# Patient Record
Sex: MALE | Race: WHITE | Employment: FULL TIME | URBAN - METROPOLITAN AREA
[De-identification: names, ages, dates, MRNs, and addresses within clinical notes are randomized per-mention and may not be internally consistent; named-entity substitution may affect disease eponyms.]

---

## 2019-12-09 ENCOUNTER — TRANSFERRED RECORDS (OUTPATIENT)
Dept: HEALTH INFORMATION MANAGEMENT | Facility: CLINIC | Age: 34
End: 2019-12-09

## 2020-01-07 ENCOUNTER — TRANSFERRED RECORDS (OUTPATIENT)
Dept: HEALTH INFORMATION MANAGEMENT | Facility: CLINIC | Age: 35
End: 2020-01-07

## 2020-01-09 PROBLEM — K21.9 GERD (GASTROESOPHAGEAL REFLUX DISEASE): Status: ACTIVE | Noted: 2019-10-04

## 2020-01-09 PROBLEM — K03.2 DENTAL EROSION: Status: ACTIVE | Noted: 2019-10-04

## 2020-01-09 PROBLEM — C92.00 AML (ACUTE MYELOBLASTIC LEUKEMIA) (H): Status: ACTIVE | Noted: 2019-12-30

## 2020-01-09 PROBLEM — H04.123 DRY EYE SYNDROME OF BOTH EYES: Status: ACTIVE | Noted: 2019-11-08

## 2020-03-11 ENCOUNTER — HEALTH MAINTENANCE LETTER (OUTPATIENT)
Age: 35
End: 2020-03-11

## 2020-03-13 DIAGNOSIS — D89.813 GVHD (GRAFT VERSUS HOST DISEASE) (H): ICD-10-CM

## 2020-03-13 DIAGNOSIS — C92.01 AML (ACUTE MYELOID LEUKEMIA) IN REMISSION (H): Primary | ICD-10-CM

## 2020-03-13 DIAGNOSIS — Z76.82 LUNG TRANSPLANT CANDIDATE: ICD-10-CM

## 2020-03-13 NOTE — Clinical Note
Appointment Request: Pre Lung Transplant New or Return Visit: NPT with testing Reason for Visit/Diagnosis: GVHD  Orders Placed: Yes  Appointment Timeframe Requested: 5/14/2020 at 310 with Pereira. Please schedule PFT complete, 6MW, Labs in AM 5/14. Patient to be seen by BMT team 12-3PM on 5/14.  Patient Aware? Yes. Physician Override Approved? N/A   Thank you,  Gela Torres RN

## 2020-03-13 NOTE — PROGRESS NOTES
Patient scheduled for a New Patient Visit with Dr Pereira for consideration of lung transplant evaluation. Patient to complete PFT complete, Six minute walk, labs prior to transplant pulmonary appointment. Dr Pereira requesting patient be seen by [facility] BMT team to provide input regarding Hx of AML and GVHD in relation to lung transplant. BMT has scheduled patient with their team on same day.

## 2020-05-14 ENCOUNTER — VIRTUAL VISIT (OUTPATIENT)
Dept: TRANSPLANT | Facility: CLINIC | Age: 35
End: 2020-05-14
Attending: INTERNAL MEDICINE
Payer: COMMERCIAL

## 2020-05-14 DIAGNOSIS — C92.01 ACUTE MYELOID LEUKEMIA IN REMISSION (H): Primary | ICD-10-CM

## 2020-05-14 DIAGNOSIS — D89.811 CHRONIC GVHD COMPLICATING BONE MARROW TRANSPLANTATION, EXTENSIVE (H): ICD-10-CM

## 2020-05-14 DIAGNOSIS — Z76.82 LUNG TRANSPLANT CANDIDATE: Primary | ICD-10-CM

## 2020-05-14 DIAGNOSIS — Z71.9 VISIT FOR COUNSELING: Primary | ICD-10-CM

## 2020-05-14 DIAGNOSIS — T86.09 CHRONIC GVHD COMPLICATING BONE MARROW TRANSPLANTATION, EXTENSIVE (H): ICD-10-CM

## 2020-05-14 DIAGNOSIS — D89.813 GVHD (GRAFT VERSUS HOST DISEASE) (H): ICD-10-CM

## 2020-05-14 RX ORDER — SULFAMETHOXAZOLE/TRIMETHOPRIM 800-160 MG
1 TABLET ORAL DAILY
COMMUNITY
Start: 2019-11-13

## 2020-05-14 RX ORDER — ALBUTEROL SULFATE 90 UG/1
2 AEROSOL, METERED RESPIRATORY (INHALATION) EVERY 6 HOURS PRN
COMMUNITY
Start: 2019-10-31

## 2020-05-14 RX ORDER — MONTELUKAST SODIUM 10 MG/1
10 TABLET ORAL EVERY EVENING
Qty: 30 TABLET | Refills: 11 | COMMUNITY
Start: 2020-05-14

## 2020-05-14 RX ORDER — BUDESONIDE AND FORMOTEROL FUMARATE DIHYDRATE 160; 4.5 UG/1; UG/1
2 AEROSOL RESPIRATORY (INHALATION)
COMMUNITY
Start: 2019-10-31

## 2020-05-14 RX ORDER — ACYCLOVIR 800 MG/1
800 TABLET ORAL EVERY 12 HOURS
COMMUNITY
Start: 2019-10-31

## 2020-05-14 RX ORDER — PREDNISONE 20 MG/1
40 TABLET ORAL EVERY OTHER DAY
COMMUNITY
Start: 2019-10-31

## 2020-05-14 RX ORDER — POSACONAZOLE 100 MG/1
300 TABLET, DELAYED RELEASE ORAL DAILY
COMMUNITY
Start: 2019-10-25

## 2020-05-14 RX ORDER — LEVOFLOXACIN 750 MG/1
750 TABLET, FILM COATED ORAL
COMMUNITY
Start: 2019-12-09 | End: 2020-05-14

## 2020-05-14 RX ORDER — ALBUTEROL SULFATE 0.83 MG/ML
2.5 SOLUTION RESPIRATORY (INHALATION) EVERY 4 HOURS PRN
COMMUNITY
Start: 2019-10-31

## 2020-05-14 RX ORDER — ERGOCALCIFEROL 1.25 MG/1
50000 CAPSULE, LIQUID FILLED ORAL WEEKLY
COMMUNITY
Start: 2019-11-23

## 2020-05-14 ASSESSMENT — PAIN SCALES - GENERAL: PAINLEVEL: NO PAIN (0)

## 2020-05-14 NOTE — PROGRESS NOTES
Reason for Visit  Artie Chan is a 35 year old year old male who is being seen for Video Visit (Consult to discuss lung transplant )  Pulmonary HPI  The patient was seen and examined by Ozzylevy Pereira MD.    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 He is followed by Dr. Sosa (pulmonary) at [facility].    He initially present with URI sx, Nausea/diarrhoea and blood work showed 280K WBC with 20% blasts which lead to diagnosis of AML (Acute monocytic leukemia - HAYDE M5). He also had CNS involvement which was treated with IT chemo in addition to induction/Consolidation, completed in 11/2014. CNS relapse in 7/2015 which was treated leading to complete remission again. This was followed by BMT on 11/24/15. He had not been seen in BMT clinic since 2017 to 1/2020.    Post tx course complicated by cGVHD of eyes, skin, mouth and lungs. He has been on tacrolimus in the past.    He presented to his PCP on 10/2019 Cough/SOB and unintentional weight loss of 12kg in six months (last six months of 2019).       At baseline has mild persistent cough and SOB.  Due to SOB has had to stop working in Jan 2020. He was treated with levaquin in 12/2020 for poss pna. On 11/22 due to worsening sx, RVP done which was positive for Rhino. His FEV1 declined from 91% (7/2016) to 31% (10/2019). He is able to walk may be about  2- 3 blocks before having to stop.  He has gained ~30pounds since Jan 2020 and is about 190#. No leg swelling.  No F/C/S.       Exposures: Former smoker quit (~3 months of use). Works in [employer] lately but was wearing his REspirator.  Has a small dog. NO Asbestos/Mold at home or TB exposure.    Oxygen use: None yet.      Current Outpatient Medications   Medication     acyclovir (ZOVIRAX) 800 MG tablet     albuterol (PROAIR  HFA/PROVENTIL HFA/VENTOLIN HFA) 108 (90 Base) MCG/ACT inhaler     albuterol (PROVENTIL) (2.5 MG/3ML) 0.083% neb solution     budesonide-formoterol (SYMBICORT) 160-4.5 MCG/ACT Inhaler     montelukast (SINGULAIR) 10 MG tablet     posaconazole (NOXAFIL) 100 MG EC tablet     predniSONE (DELTASONE) 20 MG tablet     ruxolitinib 10 MG TABS tablet     sulfamethoxazole-trimethoprim (BACTRIM DS) 800-160 MG tablet     vitamin D2 (ERGOCALCIFEROL) 09326 units (1250 mcg) capsule     No current facility-administered medications for this visit.      No Known Allergies  Past Medical History:   Diagnosis Date     AML M5 (acute monocytic leukemia) (H) 2014    Initial presentation at .     AML M5 (acute monocytic leukemia) in relapse (H) 07/2015    Relapse [facility] - CNS relapse     FH: bone marrow transplant 11/24/2015     GERD (gastroesophageal reflux disease)      GVHD as complication of bone marrow transplant (H)     Involving eyes, skin, mouth and lungs     History of blood transfusion 2014            Past Surgical History:   Procedure Laterality Date     BIOPSY      bone marrow-     BRONCHOSCOPY       TRANSPLANT  2015    [facility]       Social History     Socioeconomic History     Marital status:      Spouse name: Not on file     Number of children: Not on file     Years of education: Not on file     Highest education level: Not on file   Occupational History     Not on file   Social Needs     Financial resource strain: Not on file     Food insecurity     Worry: Not on file     Inability: Not on file     Transportation needs     Medical: Not on file     Non-medical: Not on file   Tobacco Use     Smoking status: Former Smoker     Types: Cigarettes     Smokeless tobacco: Current User     Types: Chew     Tobacco comment: smoked for 1 yr age 19, chews 1 can/week   Substance and Sexual Activity     Alcohol use: Not Currently     Drug use: Never     Sexual activity: Not on file   Lifestyle     Physical activity      Days per week: Not on file     Minutes per session: Not on file     Stress: Not on file   Relationships     Social connections     Talks on phone: Not on file     Gets together: Not on file     Attends L[employer] service: Not on file     Active member of club or organization: Not on file     Attends meetings of clubs or organizations: Not on file     Relationship status: Not on file     Intimate partner violence     Fear of current or ex partner: Not on file     Emotionally abused: Not on file     Physically abused: Not on file     Forced sexual activity: Not on file   Other Topics Concern     Parent/sibling w/ CABG, MI or angioplasty before 65F 55M? Not Asked   Social History Narrative     Not on file       Family History   Problem Relation Age of Onset     Heart Failure Maternal Grandfather      Diabetes Paternal Aunt      Hypertension Maternal Uncle        ROS Pulmonary  Constitutional- Negative  Eyes- Negative  Ear, nose and throat- Negative  Cardiac- Negative  Pulm- See HPI  GI- Negative  Genitourinary- Negative  Musculoskeletal- Positive for Arthalgias (Hips and knees, new pain that started this week) and myalgias (intermittent cramping of hands and feet).   Neurology- Negative  Dermatology- Negative  Endocrine- Negative  Lymphatic- Negative  Psychiatry- Negative  A complete ROS was otherwise negative except as noted in the HPI.  Constitutional - looks well, in no apparent distress  Eyes - no redness or discharge  Respiratory -breathing appears comfortable.  No cough. Speaking in full sentences.  Skin - No appreciable discoloration or lesions (very limited exam)  Neurological - No apparent tremors. Speech fluent and articlate  Psychiatric - no signs of delirium or anxiety     Exam limited to that easily identified on a virtual visit. The rest of a comprehensive physical examination is deferred due to PHE (public health emergency) video visit restrictions.  Results:  No results found for this or any previous  visit (from the past 168 hour(s)).    Spirometry from 4/21/20:  FVC 3.97L  63%  FEV1 1.6L 31%  Ratio 40  Conclusion: Very severe lung disease.     Chest CT (4/20/20):  Stable mild bronchiectasis, bronchial wall thickening, scattered areas of scarring, groundglass opacity and air trapping. This constellation of findings may be from bronchiolitis bladder and as a manifestation of sqqnx-xjxopx-sdjx disease. Sequela of chronic atypical infection such as from mycobacterium avium complex (MAC) could also produce this appearance. Stable dilated aortic root up to 4.2 cm.    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 No new pulmonary opacities identified. Stable dilatation of the aortic root, measuring approximately 4.3 cm.    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 He was started prednisone 60mg every other day (11/2019). It was decreased to 20mg every other day but due to SOB it was increased to 40mg every other day, Symbicort and singulair. He was started on Jakafi on 11/20/2019 for this.     It might be reasonable to consider daily azithromycin given it's benefit in post lung transplant OB. Agree with taper to 20mg every other day.     2. H/o AML, S/p BMT: We will need BMT team input as to the risk of relapse.  BMT complications include: Lung involvement as mentioned above.  cGVHD of Eyes - seen by ophthalmology, on artificial tears.  Xerostosis and Rash are not an issue at this time.    3. GERD: Sx are well controlled and is not on PPI at this time.    4. ID:   Bactrim prophylaxis based on significant prednisone dose.  Post BMT propylaxis:   -  On Acyclovir,  Posaconazole prophy started on 10/25/19 when started on steroid burst/taper.    5. Lung transplant consideration:    A. I spent quite some time discussing both the lung transplantation evaluation listing process including complications that can be expected post lung transplantation.     B. One of the main points I did reinforce is that the survival post lung transplantation at this time is around 50 to 55% at 5 years. The main reason for this is infection and/or rejection. While most bacterial infections are treatable, the viral infections can cause significant morbidity and mortality. Acute rejection is usually treatable with high dose steroids but chronic rejection (WESLEY) as you already know does not have good therapy as of date. The other main point is that there is minimal to no survival advantage with lung transplantation.    C. The lung transplant evaluation will involve multiple tests and meeting with cardiothoracic surgeon, Transplant , Nutritionist etc., from our transplant team. Post testing, we will discuss the details at our transplant meeting and will be listed if he meets the criteria for lung transplantation.     D. Once on the list, Artie Chan can expect to stay on the list anywhere from few months to few years, depending on the LAS score. Also the other factors that might play a role is number of organs required and whether he is positive for panel reactive antibodies. He has not recieved transfusions to date. He will need to stay within a 50 mile radius of our center for the first three months after the lung transplantation (after hospital discharge).    E. Post lung transplantation, he will require multiple bronchoscopies to evaluate for infections and/or rejections. The major complications post transplantation experienced by most of our patients include: 1. Diabetes, about 30 to 40% of our patients remain on insulin for the rest of their life. 2. Chronic  kidney disease, with majority losing about 50% of the kidney function and upto 5 ot 10% requiring hemodialysis and/or renal transplantation. 3. Hypertension, usually well controlled with medications. 4. Malignancy: Especially of skin cancer, and/or lymphoma - usually treatable. The above is not an exhaustive list of all the complications. The others include also airway complications occurring in about 5% of the patients requiring bronchoscopy with bronchial dilation, sometimes stent placement. There is increased risk for DVT and PE particularly in the first six months after transplantation.     F. Discussed with Artie Joaquin Dumont that lung transplantation is an option. The other option is to continue as is and continue cares with us or with her local pulmonologist. We will glad to have palliative care team involved in your care to help manage your symptoms.    G. Discussed about increased risk donors (For HIV/Hep C predominantly).       Issues:  - Dental extraction  - Currently on high dose steroids (~6 - 7 months), will need to wean it down to 20mg every other day    Mr Dumont was seen via video visit today. We discussed at length pros and cons about lung transplantation. He was given adequate time to ask and answer all the questions to their satisfaction. At this time he has not undergone a lung transplant evaluation. I believe is still too well to benefit from lung transplant evaluation. We will follow up with him in 6months. He is scheduled to meet with [facility] Lung tx team. We also discussed the possibility to double listing if and when he is a candidate for lung transplantation.  Thank you for allowing us to participate in the care of this wonderful patient. Please feel free to contact me if you have any questions at [phone].

## 2020-05-14 NOTE — LETTER
5/14/2020         RE: Artie Chan  [address] [place] [address]        Dear Colleague,    Thank you for referring your patient, Artie Chan, to the [facility] SOLID ORGAN TRANSPLANT. Please see a copy of my visit note below.    Reason for Visit  Artie Chan is a 35 year old year old male who is being seen for Video Visit (Consult to discuss lung transplant )  Pulmonary HPI  The patient was seen and examined by Ozzy Pereira MD.    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 He is followed by Dr. Sosa (pulmonary) at [facility].    He initially present with URI sx, Nausea/diarrhoea and blood work showed 280K WBC with 20% blasts which lead to diagnosis of AML (Acute monocytic leukemia - HAYDE M5). He also had CNS involvement which was treated with IT chemo in addition to induction/Consolidation, completed in 11/2014. CNS relapse in 7/2015 which was treated leading to complete remission again. This was followed by BMT on 11/24/15. He had not been seen in BMT clinic since 2017 to 1/2020.    Post tx course complicated by cGVHD of eyes, skin, mouth and lungs. He has been on tacrolimus in the past.    He presented to his PCP on 10/2019 Cough/SOB and unintentional weight loss of 12kg in six months (last six months of 2019).       At baseline has mild persistent cough and SOB.  Due to SOB has had to stop working in Jan 2020. He was treated with levaquin in 12/2020 for poss pna. On 11/22 due to worsening sx, RVP done which was positive for Rhino. His FEV1 declined from 91% (7/2016) to 31% (10/2019). He is able to walk may be about  2- 3 blocks before having to stop.  He has gained ~30pounds since Jan 2020 and is about 190#. No leg swelling.  No F/C/S.       Exposures: Former smoker quit (~3 months of use). 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  Has a small dog. NO Asbestos/Mold at home or TB exposure.    Oxygen use: None yet.      Current Outpatient Medications   Medication     acyclovir (ZOVIRAX) 800 MG tablet     albuterol (PROAIR HFA/PROVENTIL HFA/VENTOLIN HFA) 108 (90 Base) MCG/ACT inhaler     albuterol (PROVENTIL) (2.5 MG/3ML) 0.083% neb solution     budesonide-formoterol (SYMBICORT) 160-4.5 MCG/ACT Inhaler     montelukast (SINGULAIR) 10 MG tablet     posaconazole (NOXAFIL) 100 MG EC tablet     predniSONE (DELTASONE) 20 MG tablet     ruxolitinib 10 MG TABS tablet     sulfamethoxazole-trimethoprim (BACTRIM DS) 800-160 MG tablet     vitamin D2 (ERGOCALCIFEROL) 49714 units (1250 mcg) capsule     No current facility-administered medications for this visit.      No Known Allergies  Past Medical History:   Diagnosis Date     AML M5 (acute monocytic leukemia) (H) 2014    Initial presentation at .     AML M5 (acute monocytic leukemia) in relapse (H) 07/2015    Relapse [facility] - CNS relapse     FH: bone marrow transplant 11/24/2015     GERD (gastroesophageal reflux disease)      GVHD as complication of bone marrow transplant (H)     Involving eyes, skin, mouth and lungs     History of blood transfusion 2014            Past Surgical History:   Procedure Laterality Date     BIOPSY      bone marrow-     BRONCHOSCOPY       TRANSPLANT  2015    BMT-[facility]       Social History     Socioeconomic History     Marital status:      Spouse name: Not on file     Number of children: Not on file     Years of education: Not on file     Highest education level: Not on file   Occupational History     Not on file   Social Needs     Financial resource strain: Not on file     Food insecurity     Worry: Not on file     Inability: Not on file     Transportation needs     Medical: Not on file     Non-medical: Not on file   Tobacco Use     Smoking status: Former Smoker     Types: Cigarettes     Smokeless  tobacco: Current User     Types: Chew     Tobacco comment: smoked for 1 yr age 19, chews 1 can/week   Substance and Sexual Activity     Alcohol use: Not Currently     Drug use: Never     Sexual activity: Not on file   Lifestyle     Physical activity     Days per week: Not on file     Minutes per session: Not on file     Stress: Not on file   Relationships     Social connections     Talks on phone: Not on file     Gets together: Not on file     Attends [employer] service: Not on file     Active member of club or organization: Not on file     Attends meetings of clubs or organizations: Not on file     Relationship status: Not on file     Intimate partner violence     Fear of current or ex partner: Not on file     Emotionally abused: Not on file     Physically abused: Not on file     Forced sexual activity: Not on file   Other Topics Concern     Parent/sibling w/ CABG, MI or angioplasty before 65F 55M? Not Asked   Social History Narrative     Not on file       Family History   Problem Relation Age of Onset     Heart Failure Maternal Grandfather      Diabetes Paternal Aunt      Hypertension Maternal Uncle        ROS Pulmonary  Constitutional- Negative  Eyes- Negative  Ear, nose and throat- Negative  Cardiac- Negative  Pulm- See HPI  GI- Negative  Genitourinary- Negative  Musculoskeletal- Positive for Arthalgias (Hips and knees, new pain that started this week) and myalgias (intermittent cramping of hands and feet).   Neurology- Negative  Dermatology- Negative  Endocrine- Negative  Lymphatic- Negative  Psychiatry- Negative  A complete ROS was otherwise negative except as noted in the HPI.  Constitutional - looks well, in no apparent distress  Eyes - no redness or discharge  Respiratory -breathing appears comfortable.  No cough. Speaking in full sentences.  Skin - No appreciable discoloration or lesions (very limited exam)  Neurological - No apparent tremors. Speech fluent and articlate  Psychiatric - no signs of delirium  or anxiety     Exam limited to that easily identified on a virtual visit. The rest of a comprehensive physical examination is deferred due to PHE (public health emergency) video visit restrictions.  Results:  No results found for this or any previous visit (from the past 168 hour(s)).    Spirometry from 4/21/20:  FVC 3.97L  63%  FEV1 1.6L 31%  Ratio 40  Conclusion: Very severe lung disease.     Chest CT (4/20/20):  Stable mild bronchiectasis, bronchial wall thickening, scattered areas of scarring, groundglass opacity and air trapping. This constellation of findings may be from bronchiolitis bladder and as a manifestation of kuast-jujjyi-rdmz disease. Sequela of chronic atypical infection such as from mycobacterium avium complex (MAC) could also produce this appearance. Stable dilated aortic root up to 4.2 cm.    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 No new pulmonary opacities identified. Stable dilatation of the aortic root, measuring approximately 4.3 cm.    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 He was started prednisone 60mg every other day (11/2019). It was decreased to 20mg every other day but due to SOB it was increased to 40mg every other day, Symbicort and singulair. He was started on Jakafi on 11/20/2019 for this.     It might be reasonable to consider daily azithromycin given it's benefit in post lung transplant OB. Agree with taper to 20mg every other day.     2. H/o AML, S/p BMT: We will need BMT team input as to the risk of relapse.  BMT complications include: Lung involvement as mentioned above.  cGVHD of Eyes - seen by  ophthalmology, on artificial tears.  Xerostosis and Rash are not an issue at this time.    3. GERD: Sx are well controlled and is not on PPI at this time.    4. ID:   Bactrim prophylaxis based on significant prednisone dose.  Post BMT propylaxis:   - On Acyclovir,  Posaconazole prophy started on 10/25/19 when started on steroid burst/taper.    5. Lung transplant consideration:    A. I spent quite some time discussing both the lung transplantation evaluation listing process including complications that can be expected post lung transplantation.     B. One of the main points I did reinforce is that the survival post lung transplantation at this time is around 50 to 55% at 5 years. The main reason for this is infection and/or rejection. While most bacterial infections are treatable, the viral infections can cause significant morbidity and mortality. Acute rejection is usually treatable with high dose steroids but chronic rejection (WESLEY) as you already know does not have good therapy as of date. The other main point is that there is minimal to no survival advantage with lung transplantation.    C. The lung transplant evaluation will involve multiple tests and meeting with cardiothoracic surgeon, Transplant , Nutritionist etc., from our transplant team. Post testing, we will discuss the details at our transplant meeting and will be listed if he meets the criteria for lung transplantation.     D. Once on the list, Artie Chan can expect to stay on the list anywhere from few months to few years, depending on the LAS score. Also the other factors that might play a role is number of organs required and whether he is positive for panel reactive antibodies. He has not recieved transfusions to date. He will need to stay within a 50 mile radius of our center for the first three months after the lung transplantation (after hospital discharge).    E. Post lung transplantation, he will require multiple  bronchoscopies to evaluate for infections and/or rejections. The major complications post transplantation experienced by most of our patients include: 1. Diabetes, about 30 to 40% of our patients remain on insulin for the rest of their life. 2. Chronic kidney disease, with majority losing about 50% of the kidney function and upto 5 ot 10% requiring hemodialysis and/or renal transplantation. 3. Hypertension, usually well controlled with medications. 4. Malignancy: Especially of skin cancer, and/or lymphoma - usually treatable. The above is not an exhaustive list of all the complications. The others include also airway complications occurring in about 5% of the patients requiring bronchoscopy with bronchial dilation, sometimes stent placement. There is increased risk for DVT and PE particularly in the first six months after transplantation.     F. Discussed with Artie Joaquin Dustin that lung transplantation is an option. The other option is to continue as is and continue cares with us or with her local pulmonologist. We will glad to have palliative care team involved in your care to help manage your symptoms.    G. Discussed about increased risk donors (For HIV/Hep C predominantly).       Issues:  - Dental extraction  - Currently on high dose steroids (~6 - 7 months), will need to wean it down to 20mg every other day    Mr Dumont was seen via video visit today. We discussed at length pros and cons about lung transplantation. He was given adequate time to ask and answer all the questions to their satisfaction. At this time he has not undergone a lung transplant evaluation. I believe is still too well to benefit from lung transplant evaluation. We will follow up with him in 6months. He is scheduled to meet with [facility] Lung tx team. We also discussed the possibility to double listing if and when he is a candidate for lung transplantation.  Thank you for allowing us to participate in the care of this wonderful  "patient. Please feel free to contact me if you have any questions at [phone].      Artie Chan is a 35 year old male who is being evaluated via a billable video visit.      The patient has been notified of following:     \"This video visit will be conducted via a call between you and your physician/provider. We have found that certain health care needs can be provided without the need for an in-person physical exam.  This service lets us provide the care you need with a video conversation.  If a prescription is necessary we can send it directly to your pharmacy.  If lab work is needed we can place an order for that and you can then stop by our lab to have the test done at a later time.    Video visits are billed at different rates depending on your insurance coverage.  Please reach out to your insurance provider with any questions.    If during the course of the call the physician/provider feels a video visit is not appropriate, you will not be charged for this service.\"    Patient has given verbal consent for Video visit? Yes    How would you like to obtain your AVS? [facility]    Patient would like the video invitation sent by: Text to cell phone: [phone]    Will anyone else be joining your video visit? No        Video-Visit Details    Type of service:  Video Visit    Video Start Time: 3:05 PM  Video End Time: 4:12 PM    Originating Location (pt. Location): Home    Distant Location (provider location):  [facility] SOLID ORGAN TRANSPLANT     Platform used for Video Visit: Modesta Pereira MD      "

## 2020-05-14 NOTE — PROGRESS NOTES
"Artie Chan is a 35 year old male who is being evaluated via a billable video visit.      The patient has been notified of following:     \"This video visit will be conducted via a call between you and your physician/provider. We have found that certain health care needs can be provided without the need for an in-person physical exam.  This service lets us provide the care you need with a video conversation.  If a prescription is necessary we can send it directly to your pharmacy.  If lab work is needed we can place an order for that and you can then stop by our lab to have the test done at a later time.    Video visits are billed at different rates depending on your insurance coverage.  Please reach out to your insurance provider with any questions.    If during the course of the call the physician/provider feels a video visit is not appropriate, you will not be charged for this service.\"    Patient has given verbal consent for Video visit? Yes    How would you like to obtain your AVS? [facility]    Patient would like the video invitation sent by: Text to cell phone: [phone]    Will anyone else be joining your video visit? No        Video-Visit Details    Type of service:  Video Visit    Video Start Time:12 noon  Video End Time:12:40P    Originating Location (pt. Location): Home    Distant Location (provider location):  [facility] BLOOD AND MARROW TRANSPLANT     Platform used for Video Visit: [employer]     This is a video visit (total time 35 minutes) with this 35-year-old man who is a candidate for a lung transplant here at the [facility].  His past history was detailed in records from [facility] [place] where I had notes from December 2019 and 2 notes from January 2020.    Briefly he presented with white count 280,000; 20% blasts in June 2014.  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  Induction with 7+3 followed by 4 HIDAC consolidation courses was completed November " 2014 but unfortunately he had a CNS relapse in July 2015.    He achieved second complete remission and underwent a myeloablative matched unrelated donor peripheral blood transplant in November 2015.  The notes do not refer to any acute GVHD and he has no recollection of symptoms sounding like acute GVH.      He developed chronic GVHD of the eyes skin mouth and lungs but had not been regularly followed at [facility] until late 2019.  He describes to me progressive limitation of exercise with coughing and some wheezing no formal respiratory infections except one episode of bronchitis.  His coughing and dyspnea  worsened from 2018 until evaluation at [facility] in late 2019.  He had had progressive less exercise tolerance, more coughing and some wheezing at nighttime.  Additionally he has bad teeth and some sore teeth with gingival involvement but he does not describe currently mouth sores, mucosal irritation, trouble with acidic foods or other things that sound like ongoing oral chronic GVH.  He does not have a dry mouth.      He has had dry eyes for some period of time uses eyedrops 3 times daily (refresh) and has some sticky white goop in his eyes that he has to pull out once daily or so.  He is not had active ocular infections and that has not been worsening.    During his evaluation in late 2019 it was noted that he had lost some weight without trying (12 kg) but he describes no food intolerance except milk gives him diarrhea (though ice cream and cottage cheese and yogurt are fine) and he has no ongoing GI upset.  He also denies skin bone joint neurologic cardiac or other ENT symptoms except as mentioned previously.    His pulmonary evaluation includes in late January FVC 75% predicted; FEV1 33% predicted; and a ratio of 35%; mid expiratory flow rate (25 -75%) of 12% predicted; and a DLCO of 96% predicted.    Other laboratory evaluation showed good blood counts, normal serum chemistries, CD3 count  in November 2019 at 347, CD4 count 256 and an IgG level of 671which is only slightly below normal. Earlier IgG values were normal.    He had an elevated serum ferritin and a low serum vitamin D.    CT scanning showed patchy scarring bronchiolar thickening and some small lung nodules that had not changed; all compatible with bronchiolitis obliterans and it was noted that his aortic root is slightly dilated 4.3 cm.      His past medical history includes a renal calcium oxalate stone in April 2015 and not many other medical problems except GERD is listed, though not active now.    His family history includes a maternal grandfather who had heart failure; aunt with diabetes and uncle with hypertension.   He is a former smoker who quit May 2014 around the time of his leukemia diagnosis though the notes from January 2020 says he still using smokeless tobacco, occasional alcohol, but no illicit drug use.  He denies allergies.    Functionally, he now says he is okay walking on level ground without a load, for  up to a quarter of a mile but if he is carrying something or has stairs to climb his exercise tolerance is much less.  It makes him breathless.  He has coughing, occasionally of phlegm mostly not green, and if he has coughing fits they only last a few seconds, not minutes.  He has wheezing at nighttime which doesn't wake him;  but he denies pleuritic pain or any chest pain at all.    We spent the rest of the time talking about my assessment that his leukemia has a very tiny chance of recurrence being in remission now 4-1/2 years post transplant; but that is not an active concern.   His chronic GVHD seems active manifest by the bronchiolitis obliterans and obstructive lung problems but his dry eyes are modest in severity and though I could not examine his mouth, he does not describe active oral mucositis.  He demonstrated in our video call good flexibility in his arms wrists and hands and he denies any other bone, joint  or skin problems.    Thus he does not have active extrapulmonary chronic moddg-bwfpqx-nrvb disease that is of concern.  He had some improvement when therapy with a variety of bronchodilators Singulair Symbicort albuterol plus prednisone and ruxolitinib were initiated in November to December and he was able to taper his prednisone from 60 mg every other day down to 40 mg every other day, but  that dose continues as there was concern about worsening breathlessness as he tapered further.    Therefore since his chronic GVHD seems limited to his lungs I see no contraindication to consider him a candidate for a lung transplant, if it is appropriate.    I noted for him that other agents, ibrutinib or a rock 2 inhibitor  have shown some promise in managing chronic GVHD, though a bit less in pulmonary disease.  They could be considered as adjunctive therapy since he is not getting notable benefit from ruxolitinib and prednisone.    I also briefly outlined for him a retrospective review that is nearly complete evaluating lung transplantation after bone marrow transplantation here at the [facility].  It is a retrospective review over a long period of time, but includes 10 lung transplants after a prior BMT.  These led to  90% 1 year, 68% 5-year and 34% 10-year survival for those lung recipients done at variable years following their bone marrow transplant.  I emphasized that this was not predictive for him but was a retrospective long-term review that is currently being summarized in preparation for publication by our BMT and solid organ transplant teams.    His questions were answered in full and I told him that I would be happy to see him if it would be helpful when he might come to the [facility] for a formal lung transplant evaluation.  He knows he can call if questions arise.    Albino Lucas MD    Professor of medicine    [phone]

## 2020-05-14 NOTE — PROGRESS NOTES
Clinical   Blood and Marrow Transplant Service  New Transplant Visit    Per BMT MD - CHARMAINE does not need to meet with patient today as visit is not for BMT - is for clearance for lung transplant.     Kendra REILLY [facility]    Clinical   5/14/2020  [facility]  Adult Blood and Marrow Transplant Program  [address] [place] [id number]  [email or web address]  [email or web address]  Office: [phone]   Pager: [phone]

## 2020-05-14 NOTE — PROGRESS NOTES
"Artie Chan is a 35 year old male who is being evaluated via a billable video visit.      The patient has been notified of following:     \"This video visit will be conducted via a call between you and your physician/provider. We have found that certain health care needs can be provided without the need for an in-person physical exam.  This service lets us provide the care you need with a video conversation.  If a prescription is necessary we can send it directly to your pharmacy.  If lab work is needed we can place an order for that and you can then stop by our lab to have the test done at a later time.    Video visits are billed at different rates depending on your insurance coverage.  Please reach out to your insurance provider with any questions.    If during the course of the call the physician/provider feels a video visit is not appropriate, you will not be charged for this service.\"    Patient has given verbal consent for Video visit? Yes    How would you like to obtain your AVS? [facility]    Patient would like the video invitation sent by: Text to cell phone: [phone]    Will anyone else be joining your video visit? No        Video-Visit Details    Type of service:  Video Visit    Video Start Time: 3:05 PM  Video End Time: 4:12 PM    Originating Location (pt. Location): Home    Distant Location (provider location):  [facility] SOLID ORGAN TRANSPLANT     Platform used for Video Visit: Modesta Pereira MD        "

## 2020-05-14 NOTE — LETTER
"5/14/2020       RE: Artie Chan  [address]  [place] [address]     Dear Colleague,    Thank you for referring your patient, Artie Chan, to the [facility] BLOOD AND MARROW TRANSPLANT at [facility]. Please see a copy of my visit note below.    Artie Chan is a 35 year old male who is being evaluated via a billable video visit.      The patient has been notified of following:     \"This video visit will be conducted via a call between you and your physician/provider. We have found that certain health care needs can be provided without the need for an in-person physical exam.  This service lets us provide the care you need with a video conversation.  If a prescription is necessary we can send it directly to your pharmacy.  If lab work is needed we can place an order for that and you can then stop by our lab to have the test done at a later time.    Video visits are billed at different rates depending on your insurance coverage.  Please reach out to your insurance provider with any questions.    If during the course of the call the physician/provider feels a video visit is not appropriate, you will not be charged for this service.\"    Patient has given verbal consent for Video visit? Yes    How would you like to obtain your AVS? [facility]    Patient would like the video invitation sent by: Text to cell phone: [phone]    Will anyone else be joining your video visit? No        Video-Visit Details    Type of service:  Video Visit    Video Start Time:12 noon  Video End Time:12:40P    Originating Location (pt. Location): Home    Distant Location (provider location):  [facility] BLOOD AND MARROW TRANSPLANT     Platform used for Video Visit: Modesta     This is a video visit (total time 35 minutes) with this 35-year-old man who is a candidate for a lung transplant here at the [facility].  His past history was detailed [facility] where I had notes from December 2019 and 2 notes from January 2020.    Briefly he presented with white count 280,000; 20% blasts in June 2014.  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    He achieved second complete remission and underwent a myeloablative matched unrelated donor peripheral blood transplant in November 2015.  The notes do not refer to any acute GVHD and he has no recollection of symptoms sounding like acute GVH.      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  He describes to me progressive limitation of exercise with coughing and some wheezing no formal respiratory infections except one episode of bronchitis.  His coughing and dyspnea  worsened from 2018 until evaluation at [facility] in late 2019.  He had had progressive less exercise tolerance, more coughing and some wheezing at nighttime.  Additionally he has bad teeth and some sore teeth with gingival involvement but he does not describe currently mouth sores, mucosal irritation, trouble with acidic foods or other things that sound like ongoing oral chronic GVH.  He does not have a dry mouth.      He has had dry eyes for some period of time uses eyedrops 3 times daily (refresh) and has some sticky white goop in his eyes that he has to pull out once daily or so.  He is not had active ocular infections and that has not been worsening.    During his evaluation in late 2019 it was noted that he had lost some weight without trying (12 kg) but he describes no food intolerance except milk gives him diarrhea (though ice cream and cottage cheese and yogurt are fine) and he has no ongoing GI upset.  He also denies skin bone joint neurologic cardiac or other ENT symptoms except as  mentioned previously.    His pulmonary evaluation includes in late January FVC 75% predicted; FEV1 33% predicted; and a ratio of 35%; mid expiratory flow rate (25 -75%) of 12% predicted; and a DLCO of 96% predicted.    Other laboratory evaluation showed good blood counts, normal serum chemistries, CD3 count in November 2019 at 347, CD4 count 256 and an IgG level of 671which is only slightly below normal. Earlier IgG values were normal.    He had an elevated serum ferritin and a low serum vitamin D.    CT scanning showed patchy scarring bronchiolar thickening and some small lung nodules that had not changed; all compatible with bronchiolitis obliterans and it was noted that his aortic root is slightly dilated 4.3 cm.      His past medical history includes a renal calcium oxalate stone in April 2015 and not many other medical problems except GERD is listed, though not active now.    His family history includes a maternal grandfather who had heart failure; aunt with diabetes and uncle with hypertension.   He is a former smoker who quit May 2014 around the time of his leukemia diagnosis though the notes from January 2020 says he still using smokeless tobacco, occasional alcohol, but no illicit drug use.  He denies allergies.    Functionally, he now says he is okay walking on level ground without a load, for  up to a quarter of a mile but if he is carrying something or has stairs to climb his exercise tolerance is much less.  It makes him breathless.  He has coughing, occasionally of phlegm mostly not green, and if he has coughing fits they only last a few seconds, not minutes.  He has wheezing at nighttime which doesn't wake him;  but he denies pleuritic pain or any chest pain at all.    We spent the rest of the time talking about my assessment that his leukemia has a very tiny chance of recurrence being in remission now 4-1/2 years post transplant; but that is not an active concern.   His chronic GVHD seems active  manifest by the bronchiolitis obliterans and obstructive lung problems but his dry eyes are modest in severity and though I could not examine his mouth, he does not describe active oral mucositis.  He demonstrated in our video call good flexibility in his arms wrists and hands and he denies any other bone, joint or skin problems.    Thus he does not have active extrapulmonary chronic [id number]-cvhh disease that is of concern.  He had some improvement when therapy with a variety of bronchodilators Singulair Symbicort albuterol plus prednisone and ruxolitinib were initiated in November to December and he was able to taper his prednisone from 60 mg every other day down to 40 mg every other day, but  that dose continues as there was concern about worsening breathlessness as he tapered further.    Therefore since his chronic GVHD seems limited to his lungs I see no contraindication to consider him a candidate for a lung transplant, if it is appropriate.    I noted for him that other agents, ibrutinib or a rock 2 inhibitor  have shown some promise in managing chronic GVHD, though a bit less in pulmonary disease.  They could be considered as adjunctive therapy since he is not getting notable benefit from ruxolitinib and prednisone.    I also briefly outlined for him a retrospective review that is nearly complete evaluating lung transplantation after bone marrow transplantation here at the [facility].  It is a retrospective review over a long period of time, but includes 10 lung transplants after a prior BMT.  These led to  90% 1 year, 68% 5-year and 34% 10-year survival for those lung recipients done at variable years following their bone marrow transplant.  I emphasized that this was not predictive for him but was a retrospective long-term review that is currently being summarized in preparation for publication by our BMT and solid organ transplant teams.    His questions were answered in full and I  told him that I would be happy to see him if it would be helpful when he might come to the [facility] for a formal lung transplant evaluation.  He knows he can call if questions arise.    Albino Lucas MD    Professor of medicine    [phone]              Again, thank you for allowing me to participate in the care of your patient.      Sincerely,    Albino Lucas MD

## 2020-05-26 ENCOUNTER — DOCUMENTATION ONLY (OUTPATIENT)
Dept: TRANSPLANT | Facility: CLINIC | Age: 35
End: 2020-05-26

## 2021-01-04 ENCOUNTER — HEALTH MAINTENANCE LETTER (OUTPATIENT)
Age: 36
End: 2021-01-04

## 2021-04-25 ENCOUNTER — HEALTH MAINTENANCE LETTER (OUTPATIENT)
Age: 36
End: 2021-04-25

## 2021-10-10 ENCOUNTER — HEALTH MAINTENANCE LETTER (OUTPATIENT)
Age: 36
End: 2021-10-10

## 2022-05-22 ENCOUNTER — HEALTH MAINTENANCE LETTER (OUTPATIENT)
Age: 37
End: 2022-05-22

## 2022-09-18 ENCOUNTER — HEALTH MAINTENANCE LETTER (OUTPATIENT)
Age: 37
End: 2022-09-18

## 2023-06-04 ENCOUNTER — HEALTH MAINTENANCE LETTER (OUTPATIENT)
Age: 38
End: 2023-06-04